# Patient Record
Sex: FEMALE | Race: WHITE | Employment: FULL TIME | URBAN - METROPOLITAN AREA
[De-identification: names, ages, dates, MRNs, and addresses within clinical notes are randomized per-mention and may not be internally consistent; named-entity substitution may affect disease eponyms.]

---

## 2024-01-17 ENCOUNTER — HOSPITAL ENCOUNTER (EMERGENCY)
Facility: HOSPITAL | Age: 29
Discharge: HOME | End: 2024-01-17
Attending: EMERGENCY MEDICINE
Payer: COMMERCIAL

## 2024-01-17 VITALS
HEART RATE: 97 BPM | OXYGEN SATURATION: 99 % | TEMPERATURE: 96.8 F | RESPIRATION RATE: 18 BRPM | DIASTOLIC BLOOD PRESSURE: 89 MMHG | BODY MASS INDEX: 35.12 KG/M2 | WEIGHT: 186 LBS | SYSTOLIC BLOOD PRESSURE: 149 MMHG | HEIGHT: 61 IN

## 2024-01-17 DIAGNOSIS — K08.89 PAIN, DENTAL: Primary | ICD-10-CM

## 2024-01-17 PROCEDURE — 99284 EMERGENCY DEPT VISIT MOD MDM: CPT | Performed by: EMERGENCY MEDICINE

## 2024-01-17 PROCEDURE — 99283 EMERGENCY DEPT VISIT LOW MDM: CPT | Performed by: EMERGENCY MEDICINE

## 2024-01-17 RX ORDER — AMOXICILLIN AND CLAVULANATE POTASSIUM 875; 125 MG/1; MG/1
1 TABLET, FILM COATED ORAL EVERY 12 HOURS
Qty: 14 TABLET | Refills: 0 | Status: SHIPPED | OUTPATIENT
Start: 2024-01-17 | End: 2024-01-24

## 2024-01-17 RX ORDER — IBUPROFEN 600 MG/1
600 TABLET ORAL EVERY 6 HOURS PRN
Qty: 21 TABLET | Refills: 0 | Status: SHIPPED | OUTPATIENT
Start: 2024-01-17 | End: 2024-01-24

## 2024-01-17 RX ORDER — HYDROCODONE BITARTRATE AND ACETAMINOPHEN 5; 325 MG/1; MG/1
1 TABLET ORAL EVERY 6 HOURS PRN
Qty: 20 TABLET | Refills: 0 | Status: SHIPPED | OUTPATIENT
Start: 2024-01-17 | End: 2024-01-22

## 2024-01-17 ASSESSMENT — PAIN DESCRIPTION - LOCATION: LOCATION: TEETH

## 2024-01-17 ASSESSMENT — LIFESTYLE VARIABLES
HAVE YOU EVER FELT YOU SHOULD CUT DOWN ON YOUR DRINKING: NO
HAVE PEOPLE ANNOYED YOU BY CRITICIZING YOUR DRINKING: NO
REASON UNABLE TO ASSESS: YES
EVER HAD A DRINK FIRST THING IN THE MORNING TO STEADY YOUR NERVES TO GET RID OF A HANGOVER: NO
EVER FELT BAD OR GUILTY ABOUT YOUR DRINKING: NO

## 2024-01-17 ASSESSMENT — COLUMBIA-SUICIDE SEVERITY RATING SCALE - C-SSRS
6. HAVE YOU EVER DONE ANYTHING, STARTED TO DO ANYTHING, OR PREPARED TO DO ANYTHING TO END YOUR LIFE?: NO
1. IN THE PAST MONTH, HAVE YOU WISHED YOU WERE DEAD OR WISHED YOU COULD GO TO SLEEP AND NOT WAKE UP?: NO
2. HAVE YOU ACTUALLY HAD ANY THOUGHTS OF KILLING YOURSELF?: NO

## 2024-01-17 ASSESSMENT — PAIN - FUNCTIONAL ASSESSMENT: PAIN_FUNCTIONAL_ASSESSMENT: 0-10

## 2024-01-17 ASSESSMENT — PAIN SCALES - GENERAL: PAINLEVEL_OUTOF10: 5 - MODERATE PAIN

## 2024-01-17 NOTE — ED PROVIDER NOTES
HPI   Chief Complaint   Patient presents with    Dental Pain       This is a 28-year-old female presents the emergency room with dental pain.  Patient states that about a year ago she had pain in the same tooth, went to see a dentist and they said they could not find a cavity but put her on a course of antibiotics after cleaning her teeth.  She has not followed up since not had any issues until Friday.  She states sometime last week she was eating an Oreo cookie and that same tooth that gave her issues last year cracked while eating an Oreo, and the edge of it fell off.  She states since then the next day started getting pain and throbbing in that tooth has been getting worse ever since.  She is been taking Motrin 600 and has not been helping.  She denies any facial swelling, no gum swelling, no fevers or chills, no sore throat otherwise.  There have been no neck pain.  She states she had some old amoxicillin that was over a-year-old and started taking that and has been helping at all.  She lost the information for the dentist that she saw last time but was referred from here therefore I believe it was Dr. Perez or Dr. Contreras.    LMP:  patient states she is not pregnant                           [place] Coma Scale Score: 15                  Patient History   No past medical history on file.  No past surgical history on file.  No family history on file.  Social History     Tobacco Use    Smoking status: Not on file    Smokeless tobacco: Not on file   Substance Use Topics    Alcohol use: Not on file    Drug use: Not on file       Physical Exam   ED Triage Vitals [01/17/24 1402]   Temp Heart Rate Resp BP   36 °C (96.8 °F) 97 18 149/89      SpO2 Temp Source Heart Rate Source Patient Position   99 % Temporal Monitor Sitting      BP Location FiO2 (%)     Right arm --       Physical Exam  Constitutional:       Appearance: Normal appearance.   HENT:      Head: Normocephalic.      Right Ear: Tympanic membrane normal.       Left Ear: Tympanic membrane normal.      Mouth/Throat:      Mouth: Mucous membranes are dry.   Eyes:      Pupils: Pupils are equal, round, and reactive to light.   Cardiovascular:      Rate and Rhythm: Normal rate and regular rhythm.   Pulmonary:      Effort: Pulmonary effort is normal.   Musculoskeletal:      Cervical back: Normal range of motion.   Skin:     General: Skin is warm.   Neurological:      General: No focal deficit present.      Mental Status: She is alert and oriented to person, place, and time.         ED Course & MDM   Diagnoses as of 01/17/24 1417   Pain, dental       Medical Decision Making  There is no sign of swelling submentally, around the area of the gum, no palpable abscess at this time.  The pain distribution appears to be at the inferior alveolar no nerve up to the anterior portion of the ear and she feels a going into her ear therefore I believe she most likely has nerve inflammation and possible abscess at the base that cannot be seen at this time.  I recommended her stopping the amoxicillin and starting Augmentin, Motrin 600 as well as a Norco as directed and follow-up with Dr. Ana MADRIGAL for further evaluation and management.    She was instructed return to the emergency room for any increasing facial swelling, fevers and chills, worsening pain.        Procedure  Procedures     Ross Vilchis, DO  01/17/24 1416

## 2024-01-17 NOTE — DISCHARGE INSTRUCTIONS
Please follow-up with Dr. Perez our dentist/oral surgeon on-call.  7 appointment to see him for the fractured tooth.  Please take the antibiotic prescribed as directed and stop the amoxicillin.  Note that you must take the antibiotics with milk or food otherwise it will cause nausea.  Pain medications also been prescribed, please take that as needed as well as the Motrin 600.  Return to the emergency room for any increasing facial swelling, redness and pain, or any worsening concerning symptoms.

## 2024-08-10 ENCOUNTER — HOSPITAL ENCOUNTER (EMERGENCY)
Facility: HOSPITAL | Age: 29
Discharge: HOME | End: 2024-08-10
Attending: EMERGENCY MEDICINE
Payer: COMMERCIAL

## 2024-08-10 VITALS
SYSTOLIC BLOOD PRESSURE: 113 MMHG | HEIGHT: 60 IN | HEART RATE: 99 BPM | BODY MASS INDEX: 35.34 KG/M2 | DIASTOLIC BLOOD PRESSURE: 73 MMHG | WEIGHT: 180 LBS | OXYGEN SATURATION: 100 % | RESPIRATION RATE: 20 BRPM | TEMPERATURE: 96.8 F

## 2024-08-10 DIAGNOSIS — T50.901A ACCIDENTAL DRUG INGESTION, INITIAL ENCOUNTER: Primary | ICD-10-CM

## 2024-08-10 DIAGNOSIS — F12.920 CANNABIS INTOXICATION WITHOUT COMPLICATION (MULTI): ICD-10-CM

## 2024-08-10 LAB — GLUCOSE BLD MANUAL STRIP-MCNC: 93 MG/DL (ref 74–99)

## 2024-08-10 PROCEDURE — 99283 EMERGENCY DEPT VISIT LOW MDM: CPT

## 2024-08-10 PROCEDURE — 82947 ASSAY GLUCOSE BLOOD QUANT: CPT

## 2024-08-10 PROCEDURE — 99283 EMERGENCY DEPT VISIT LOW MDM: CPT | Performed by: EMERGENCY MEDICINE

## 2024-08-10 ASSESSMENT — COLUMBIA-SUICIDE SEVERITY RATING SCALE - C-SSRS
1. IN THE PAST MONTH, HAVE YOU WISHED YOU WERE DEAD OR WISHED YOU COULD GO TO SLEEP AND NOT WAKE UP?: NO
2. HAVE YOU ACTUALLY HAD ANY THOUGHTS OF KILLING YOURSELF?: NO
6. HAVE YOU EVER DONE ANYTHING, STARTED TO DO ANYTHING, OR PREPARED TO DO ANYTHING TO END YOUR LIFE?: NO

## 2024-08-10 ASSESSMENT — LIFESTYLE VARIABLES
HAVE PEOPLE ANNOYED YOU BY CRITICIZING YOUR DRINKING: NO
TOTAL SCORE: 0
EVER FELT BAD OR GUILTY ABOUT YOUR DRINKING: NO
EVER HAD A DRINK FIRST THING IN THE MORNING TO STEADY YOUR NERVES TO GET RID OF A HANGOVER: NO
HAVE YOU EVER FELT YOU SHOULD CUT DOWN ON YOUR DRINKING: NO

## 2024-08-10 ASSESSMENT — PAIN - FUNCTIONAL ASSESSMENT: PAIN_FUNCTIONAL_ASSESSMENT: 0-10

## 2024-08-10 ASSESSMENT — PAIN SCALES - GENERAL: PAINLEVEL_OUTOF10: 0 - NO PAIN

## 2024-08-10 NOTE — ED PROVIDER NOTES
HPI   Chief Complaint   Patient presents with    Allergic Reaction     Pt report ate edible on accident, does not usually use THC.          Patient is a 29-year-old female who presents the emergency department via private vehicle after an accidental THC ingestion.  Patient ate some of her partners Gummies thinking that they were regular candy.  Family was at dinner, patient suddenly began to feel abnormal.  She was nauseous and did have 1 episode of emesis.  Symptoms have been improving.  Patient is denying any rash, chest pain, shortness of breath, abdominal pain, numbness, tingling or weakness.  Denies any other ingestions.  Patient was not suicidal or homicidal.              Patient History   History reviewed. No pertinent past medical history.  History reviewed. No pertinent surgical history.  No family history on file.  Social History     Tobacco Use    Smoking status: Never    Smokeless tobacco: Never   Substance Use Topics    Alcohol use: Never    Drug use: Yes     Types: Marijuana       Physical Exam   ED Triage Vitals [08/10/24 1838]   Temperature Heart Rate Respirations BP   36 °C (96.8 °F) (!) 103 20 112/62      Pulse Ox Temp Source Heart Rate Source Patient Position   98 % Temporal Monitor Sitting      BP Location FiO2 (%)     Right arm --       Physical Exam  Vitals and nursing note reviewed.   Constitutional:       General: She is not in acute distress.     Appearance: She is not toxic-appearing.   HENT:      Head: Normocephalic and atraumatic.      Nose: Nose normal.      Mouth/Throat:      Mouth: Mucous membranes are moist.      Pharynx: Oropharynx is clear.   Eyes:      Extraocular Movements: Extraocular movements intact.      Conjunctiva/sclera: Conjunctivae normal.   Cardiovascular:      Rate and Rhythm: Normal rate and regular rhythm.      Heart sounds: No murmur heard.  Pulmonary:      Effort: Pulmonary effort is normal. No respiratory distress.      Breath sounds: Normal breath sounds.    Abdominal:      General: There is no distension.      Palpations: Abdomen is soft.      Tenderness: There is no abdominal tenderness. There is no guarding or rebound.   Musculoskeletal:         General: No tenderness or deformity. Normal range of motion.      Cervical back: Neck supple. No tenderness.   Skin:     General: Skin is warm and dry.      Findings: No rash.   Neurological:      General: No focal deficit present.      Mental Status: She is alert. Mental status is at baseline.   Psychiatric:         Mood and Affect: Mood normal.         Behavior: Behavior normal.           ED Course & MDM   Diagnoses as of 08/10/24 1950   Accidental drug ingestion, initial encounter   Cannabis intoxication without complication (Multi)                 No data recorded     [place] Coma Scale Score: 15 (08/10/24 1835 : Nirmala Best, ASAD)                           Medical Decision Making  Patient is seen and examined.  Patient feels better at this time.  She has been eating without further emesis.  Patient states she would like to go home and go to sleep.  She has no current complaints at this time.  She is not driving.  It is recommended that she does not drive until her symptoms have resolved.  She is advised to follow-up with her primary care physician.  Given return precautions to the emergency department.  Patient and family understand agree with discharge plan        Procedure  Procedures     Medardo Amezcua, DO  08/10/24 1953

## 2024-08-10 NOTE — DISCHARGE INSTRUCTIONS
Follow-up with your primary care physician.  Seek immediate medical attention with any worsening symptoms, chest pain, shortness of breath, difficulty breathing or for any reason